# Patient Record
(demographics unavailable — no encounter records)

---

## 2025-01-09 NOTE — HISTORY OF PRESENT ILLNESS
[FreeTextEntry1] : Interval hc: no issues, no changes  Thyroid nodule incidentally on Ct scan for back pains due to lumbar herniated discs (march 2022), treated with diclofenac and steroid (April 2022 after labs) and ultimately had an epidural injection (May 2022).  Thyroid nodule confirmed with thyroid sonogram by PCP.  Labs also showed low TSH level that normalized on repeat testing. S/p benign thyroid FNA biopsy 8/2022  MODIFYING FACTORS: no thyroid medication.   (+) father, mother and grandmother with thyroid disease. mom - large nodule removed, no thyroid cancer, dad "hyperthyroid" No history of radiation exposure to the neck.  No history of Amiodarone or Lithium use.

## 2025-01-09 NOTE — PHYSICAL EXAM
[Alert] : alert [No Acute Distress] : no acute distress [No LAD] : no lymphadenopathy [Oriented x3] : oriented to person, place, and time [Normal Affect] : the affect was normal [Normal Insight/Judgement] : insight and judgment were intact [Normal Mood] : the mood was normal [Normal S1, S2] : normal S1 and S2 [Normal Rate] : heart rate was normal [de-identified] : Left thyroid nodule - mildlly tender, firm

## 2025-01-09 NOTE — DATA REVIEWED
[FreeTextEntry1] : Thyroid sonogram 3/31/22 Right lower pole nodule 7x5x6 mm nodule - solid and cystic Left midpole solid and cystic nodule [id number] mm  Left thyroid nodule FNA biopsy 8/5/22 - benign, adenomatous goiter, cat 2  Thyroid sonogram 12/2022  RLP nodule 9x7x6 mm, TR2 LMP nodule [id number] mm, TR3 (benign FNA in 2022)  Thyroid sono 12/23/23 reviewed RMP cyst 3 mm, TR1 RMP nodule 8x7x6 mm - stable, TR2 LMP nodule [id number] - slightly smaller, benign FNA in past ======================================================== Labs 4/2/22 TSH 0.33 FT4 1.6  Labs 6/18/22 normal hormonal evaluation normal thyroid levels  neg thyroid antibodies  labs 11/26/22 TSh 0.56, t4 7.7, ft4 1.2, t3 105, ft3 3.1  :abs 12/2022 normal 17OHP, DHEAS, FSH, estradiol and testo Labs 11/2023 TSH 0.50

## 2025-01-09 NOTE — ASSESSMENT
[FreeTextEntry1] : 38 year old female with:  1. bilateral thyroid nodule with dominant left nodule - Left nodule FNA biopsy 8/2022 was benign, she is euthyroid, nodules stable on sono 12/2023 - repeat thyroid sonogram to monitor size and appearance of nodules and check TSH, can f/u 1 year if stable